# Patient Record
(demographics unavailable — no encounter records)

---

## 2025-01-28 NOTE — REVIEW OF SYSTEMS
[Fever] : no fever [Fatigue] : fatigue [Chest Pain] : no chest pain [Shortness Of Breath] : no shortness of breath [Diarrhea] : diarrhea [Skin Rash] : skin rash

## 2025-01-28 NOTE — PHYSICAL EXAM
[No Acute Distress] : no acute distress [Well Nourished] : well nourished [Well Developed] : well developed [Well-Appearing] : well-appearing [Normal Sclera/Conjunctiva] : normal sclera/conjunctiva [Normal Outer Ear/Nose] : the outer ears and nose were normal in appearance [No Respiratory Distress] : no respiratory distress  [No Focal Deficits] : no focal deficits [Normal Affect] : the affect was normal [Normal Insight/Judgement] : insight and judgment were intact [de-identified] : Will hydrate [de-identified] : Positive feeling of a rash right lower leg/ankle

## 2025-01-28 NOTE — HISTORY OF PRESENT ILLNESS
[Home] : at home, [unfilled] , at the time of the visit. [Other Location: e.g. Home (Enter Location, City,State)___] : at [unfilled] [Verbal consent obtained from patient] : the patient, [unfilled] [FreeTextEntry8] : 23-year-old female with history of Chiari I malformation and pituitary adenoma complaining of viral gastroenteritis symptoms which started 3 days ago.  Patient was seen at [facility] initially and diagnosed with "norovirus".  Patient states her symptoms have been improving and she is now able to tolerate p.o. despite having some residual diarrhea.  Patient complaining feeling lethargic as if her blood sugar was low but she checked it and it was normal. patient is currently being followed at [facility] for pituitary adenoma and was concerned she might be having low level of cortisol or whether her symptoms are just related to her current viral illness.  Patient also complaining of blistering type rash on her lower leg/ankle which seems to be improving at this time

## 2025-01-28 NOTE — ASSESSMENT
[FreeTextEntry1] : On virtual exam patient well-appearing in no acute distress At this time doubt the patient's symptoms are related to her pituitary adenoma and seems more likely to be related to her viral illness

## 2025-01-28 NOTE — PLAN
[FreeTextEntry1] : Patient to continue symptomatic and supportive care at home Increase p.o. fluids, advance diet as tolerated Patient instructed to follow-up with her primary care next 1 to 2 days Instruction and precautions reviewed